# Patient Record
Sex: FEMALE | Race: WHITE
[De-identification: names, ages, dates, MRNs, and addresses within clinical notes are randomized per-mention and may not be internally consistent; named-entity substitution may affect disease eponyms.]

---

## 2017-12-19 NOTE — PCVCIMAG
--------------- APPROVED REPORT --------------





Exam:  Stress Echocardiogram

Indication: Jaw pain, HTN, HLP, dizziness

Patient Location: Echo lab

Stress Nurse: Toshia Lackey RN

Status: routine



Ht: 5 ft 7 in  

HR: 69 bpm      BP: 110/78 mmHg

Rhythm: NSR



Procedure

The patient underwent an Exercise Stress Test using the Amadou 

Protocol. Blood pressure, heart rate, and EKG were monitored.

An Echocardiogram was performed by technician in four stages in quad 

fashion.  At peak stress, four selected images were obtained and 

placed side by side with resting images for comparison.



Stress Test Details

Stress Test:  Exercise stress testing was performed using a Amadou 

protocol.

HR

Resting HR:            69 bpmMax Heart Rate (APMHR): 160 bpm 

Max HR Achieved:  176 bpmTarget HR (85% APMHR): 136 bpm

% of APMHR:         110

Recovery HR:            71 bpm

HR response to stress: Normal HR response to stress



BP

Resting BP:  110/78 mmHg

Max BP:       210/104 mmHg

Recovery BP:       160/74 mmHg



ECG

Resting ECG:  Sinus Rhythm

Stress ECG:     Sinus Rhythm

ST Change: Non-specific upsloping ST changes

Arrhythmia:    None

Recovery ECG: Sinus Rhythm

Recovery Arrhythmia: None



Clinical

Reason for Termination: Maximal effort

Exercise duration: 9 min 46 sec

Highest Stage Achieved: Stage 4: 4.2 mph at 16% grade. 

Exercise capacity: 12.8 METs

Overall Exercise Capacity for Age: Good



Pre-Stress Echo

The resting Echocardiogram showed normal left ventricular 

contractility with an estimated Ejection Fraction of about %. 

Normal wall motion in all segments on baseline images.



Post-Stress Echo

The stress Echocardiogram showed normal left ventricular 

contractility with an estimated Ejection Fraction of about %. 

Normal augmentation of wall motion in all segments on post stress 

images.



Clinical

No clinical or ECG evidence for ischemia.



Conclusion

Clinical Response:  Non-ischemic

Exercise Capacity:  Average

Stress ECG Response:  Non-ischemic

Stress Echo Images:  Non-ischemic

The left ventricle is normal in size and wall thickness in both the 

rest and stress images.



Other Information

Study Quality: Good



&lt;Conclusion&gt;

The left ventricle is normal in size and wall thickness in both the 

rest and stress images.

## 2017-12-19 NOTE — PCVCIMAG
EXAM: BILATERAL CAROTID DUPLEX



INDICATION: Carotid Occlusive Disease.



FINDINGS: 

Doppler Measurements (centimeters per second):



RIGHT:  Peak CCA-73, Peak ECA-66, Diastolic ICA-40, Peak ICA-99,

ICA/CCA Ratio-1.4.



LEFT:  Peak CCA-88, Peak ECA-59, Diastolic ICA-37, Peak ICA-80,

ICA/CCA Ratio-0.9.



RIGHT CAROTID: The carotid bulb has minimal plaque. The proximal

internal carotid artery shows no significant stenosis. The common

carotid artery shows no significant stenosis. The external carotid

artery shows no significant stenosis.



LEFT CAROTID:  The carotid bulb has minimal plaque. The proximal

internal carotid artery shows no significant stenosis. The common

carotid artery shows no significant stenosis. The external carotid

artery shows no significant stenosis.



Antegrade flow in both vertebral arteries.



IMPRESSION: 

No significant stenosis of the right internal carotid artery with

minimal plaque.

No significant stenosis of the left internal carotid artery with

minimal plaque.



LOC:[id number]

## 2018-05-21 ENCOUNTER — HOSPITAL ENCOUNTER (OUTPATIENT)
Dept: HOSPITAL 61 - PCVCIMAG | Age: 63
Discharge: HOME | End: 2018-05-21
Attending: INTERNAL MEDICINE
Payer: COMMERCIAL

## 2018-05-21 DIAGNOSIS — E78.00: ICD-10-CM

## 2018-05-21 DIAGNOSIS — K21.9: ICD-10-CM

## 2018-05-21 DIAGNOSIS — M79.604: Primary | ICD-10-CM

## 2018-05-21 DIAGNOSIS — I10: ICD-10-CM

## 2018-05-21 DIAGNOSIS — M79.89: ICD-10-CM

## 2018-05-21 DIAGNOSIS — F41.9: ICD-10-CM

## 2018-05-21 DIAGNOSIS — M79.605: ICD-10-CM

## 2018-05-21 PROCEDURE — 36415 COLL VENOUS BLD VENIPUNCTURE: CPT

## 2018-05-21 PROCEDURE — 93970 EXTREMITY STUDY: CPT

## 2019-11-14 ENCOUNTER — HOSPITAL ENCOUNTER (OUTPATIENT)
Dept: HOSPITAL 61 - PCVCIMAG | Age: 64
Discharge: HOME | End: 2019-11-14
Attending: INTERNAL MEDICINE
Payer: COMMERCIAL

## 2019-11-14 DIAGNOSIS — I10: Primary | ICD-10-CM

## 2019-11-14 DIAGNOSIS — Z88.5: ICD-10-CM

## 2019-11-14 DIAGNOSIS — E87.6: ICD-10-CM

## 2019-11-14 DIAGNOSIS — E78.00: ICD-10-CM

## 2019-11-14 DIAGNOSIS — F41.9: ICD-10-CM

## 2019-11-14 DIAGNOSIS — M79.7: ICD-10-CM

## 2019-11-14 PROCEDURE — 93351 STRESS TTE COMPLETE: CPT

## 2019-11-14 PROCEDURE — 93325 DOPPLER ECHO COLOR FLOW MAPG: CPT

## 2019-11-15 ENCOUNTER — HOSPITAL ENCOUNTER (OUTPATIENT)
Dept: HOSPITAL 61 - PCVCIMAG | Age: 64
Discharge: HOME | End: 2019-11-15
Attending: INTERNAL MEDICINE
Payer: COMMERCIAL

## 2019-11-15 DIAGNOSIS — R07.89: ICD-10-CM

## 2019-11-15 DIAGNOSIS — Z88.5: ICD-10-CM

## 2019-11-15 DIAGNOSIS — Z88.8: ICD-10-CM

## 2019-11-15 DIAGNOSIS — K21.9: ICD-10-CM

## 2019-11-15 DIAGNOSIS — J45.909: ICD-10-CM

## 2019-11-15 DIAGNOSIS — E78.5: ICD-10-CM

## 2019-11-15 DIAGNOSIS — R06.09: ICD-10-CM

## 2019-11-15 DIAGNOSIS — Z90.49: ICD-10-CM

## 2019-11-15 DIAGNOSIS — Z79.899: ICD-10-CM

## 2019-11-15 DIAGNOSIS — I10: Primary | ICD-10-CM

## 2019-11-15 DIAGNOSIS — R00.1: ICD-10-CM

## 2019-11-15 DIAGNOSIS — Z82.49: ICD-10-CM

## 2019-11-15 DIAGNOSIS — Z90.710: ICD-10-CM

## 2019-11-15 DIAGNOSIS — Z83.3: ICD-10-CM

## 2019-11-15 DIAGNOSIS — E78.00: ICD-10-CM

## 2019-11-15 DIAGNOSIS — E03.9: ICD-10-CM

## 2019-11-15 PROCEDURE — 76770 US EXAM ABDO BACK WALL COMP: CPT

## 2019-11-15 PROCEDURE — 93975 VASCULAR STUDY: CPT

## 2019-11-15 NOTE — PCVCIMAG
EXAM: BILATERAL RENAL ULTRASOUND AND BILATERAL RENAL DUPLEX



INDICATION: Hypertension



FINDINGS: 

Right kidney: Length measures 11.0 cm. No hydronephrosis or extensive

renal scarring.



Right renal duplex: Adequate technical quality. No sonographic

evidence of renal artery stenosis. The aortic to renal artery ratio is

1.5. The renal vein is patent.



Left kidney: Length measures 10.8 cm. No hydronephrosis or extensive

renal scarring.



Left renal duplex: Adequate technical quality. No sonographic evidence

of renal artery stenosis. The aortic to renal artery ratio is 1.0. The

renal vein is patent.



Bladder: No obvious abnormalities.



IMPRESSION: 

No significant renal artery stenosis.

No hydronephrosis bilaterally.



LOC:[id number]

## 2019-11-15 NOTE — PCVCIMAG
--------------- APPROVED REPORT --------------





Study performed:  11/14/2019 15:44:34



Exam:  Stress Echocardiogram

Indication: Chest pressure,dyspnea,HCL,HTN

Patient Location: Echo lab

Stress Nurse: Toshia Lackey RN

Room #: 2

Status: routine



Ht: 5 ft 7 in  

HR: 67 bpm      BP: 140/90 mmHg

Rhythm: NSR



Medical History

Medical History: HTN,HCL, Chest pressure,dyspnea

Cardiac Risk Factors: HTN, Hyperlipidemia

Previous Cardiac Procedures: none

Pretest Chest Pain Characteristics: No chest pain

Exercise History: Sedentary



Procedure

The patient underwent an Exercise Stress Test using the Amadou 

Protocol. Blood pressure, heart rate, and EKG were monitored.

An Echocardiogram was performed by technician in four stages in quad 

fashion.  At peak stress, four selected images were obtained and 

placed side by side with resting images for comparison.



Stress Test Details

Stress Test:  Exercise stress testing was performed using a Amadou 

protocol.

HR

Resting HR:            67 bpmMax Heart Rate (APMHR): 158 bpm 

Max HR Achieved:  162 bpmTarget HR (85% APMHR): 134 bpm

% of APMHR:         102

Recovery HR:            70 bpm

HR response to stress: Normal HR response to stress



BP

Resting BP:  140/90 mmHg

Max BP:       224/112 mmHg

Recovery BP:       174/100 mmHg

BP response to stress: Abnormal hypertensive response to 

stress.

ECG

Resting ECG:  Sinus Rhythm

Stress ECG:     Sinus Rhythm, NSSTT changes

ST Change: Non-ischemic

Maximum ST Deviation: -1.65 mm

Arrhythmia:    None

Recovery ECG: Sinus Rhythm, NSSTT changes

Recovery ST Change: Non-ischemic

Recovery ST Deviation: 0.25 mm

Recovery Arrhythmia: None



Clinical

Reason for Termination: Maximal effort

Stress Symptoms: chest tightness

Exercise duration: 6 min 32 sec

Highest Stage Achieved: Stage 3: 3.4 mph at 14% grade. 

Exercise capacity: 8.6 METs

Overall Exercise Capacity for Age: Poor

Scale: Sedentary

Angina Score: None

Symptoms resolved during recovery.



Stress ECG Conclusion

Mckenzie Treadmill Score is 14.3 which is Low risk.



Pre-Stress Echo

The resting Echocardiogram showed normal left ventricular 

contractility with an estimated Ejection Fraction of about 55-60%. 

Normal wall motion in all segments on baseline images.



Post-Stress Echo

The stress Echocardiogram showed normal left ventricular 

contractility with an estimated Ejection Fraction of about 65-70%. 

Normal augmentation of wall motion in all segments on post stress 

images.



Clinical

No clinical or ECG evidence for ischemia. Stress was stopped due to 

blood pressure elevation and fatigue.



Conclusion

Clinical Response:  Non-ischemic

Exercise Capacity:  Average

Stress ECG Response:  Non-ischemic

Stress Echo Images:  Non-ischemic

No clinical, EKG or echocardiographic evidence for ischemia. 

No echocardiographic evidence for exercise induced ischemia.

Normal stress echocardiogram with maximal exercise stress.

 Normal color doppler. No regurgitation or stenosis present on 

pulmonic, mitral, tricuspid and aortic valves.



<Conclusion>

No clinical, EKG or echocardiographic evidence for ischemia. 

No echocardiographic evidence for exercise induced ischemia.

Normal stress echocardiogram with maximal exercise stress.

 Normal color doppler. No regurgitation or stenosis present on 

pulmonic, mitral, tricuspid and aortic valves.